# Patient Record
Sex: FEMALE | Race: WHITE | NOT HISPANIC OR LATINO | Employment: FULL TIME | URBAN - METROPOLITAN AREA
[De-identification: names, ages, dates, MRNs, and addresses within clinical notes are randomized per-mention and may not be internally consistent; named-entity substitution may affect disease eponyms.]

---

## 2018-11-26 LAB
ABO + RH BLD: NORMAL
ABO + RH BLD: NORMAL
HBV SURFACE AG SERPL QL IA: NEGATIVE
HIV 1+2 AB+HIV1 P24 AG SERPL QL IA: NEGATIVE
RUBELLA ABY IGG: NORMAL

## 2019-05-29 LAB — GROUP B STREP PCR: NEGATIVE

## 2019-06-22 ENCOUNTER — ANESTHESIA EVENT (OUTPATIENT)
Dept: OBGYN | Facility: CLINIC | Age: 39
End: 2019-06-22
Payer: COMMERCIAL

## 2019-06-22 ENCOUNTER — HOSPITAL ENCOUNTER (INPATIENT)
Facility: CLINIC | Age: 39
LOS: 3 days | Discharge: HOME HEALTH | End: 2019-06-25
Attending: SPECIALIST | Admitting: SPECIALIST
Payer: COMMERCIAL

## 2019-06-22 ENCOUNTER — ANESTHESIA (OUTPATIENT)
Dept: OBGYN | Facility: CLINIC | Age: 39
End: 2019-06-22
Payer: COMMERCIAL

## 2019-06-22 ENCOUNTER — HOSPITAL ENCOUNTER (OUTPATIENT)
Facility: CLINIC | Age: 39
Discharge: HOME | End: 2019-06-22
Attending: SPECIALIST | Admitting: SPECIALIST
Payer: COMMERCIAL

## 2019-06-22 VITALS
BODY MASS INDEX: 32.28 KG/M2 | DIASTOLIC BLOOD PRESSURE: 79 MMHG | WEIGHT: 171 LBS | RESPIRATION RATE: 16 BRPM | TEMPERATURE: 98.1 F | HEART RATE: 94 BPM | SYSTOLIC BLOOD PRESSURE: 122 MMHG | HEIGHT: 61 IN

## 2019-06-22 LAB
ABO + RH BLD: NORMAL
ABO + RH BLD: NORMAL
BLD GP AB SCN SERPL QL: NORMAL
BLOOD BANK CMNT PATIENT-IMP: NORMAL
HGB BLD-MCNC: 12.5 G/DL (ref 11.7–15.7)
SPECIMEN EXP DATE BLD: NORMAL

## 2019-06-22 PROCEDURE — 86780 TREPONEMA PALLIDUM: CPT | Performed by: SPECIALIST

## 2019-06-22 PROCEDURE — 00HU33Z INSERTION OF INFUSION DEVICE INTO SPINAL CANAL, PERCUTANEOUS APPROACH: ICD-10-PCS | Performed by: ANESTHESIOLOGY

## 2019-06-22 PROCEDURE — 59025 FETAL NON-STRESS TEST: CPT

## 2019-06-22 PROCEDURE — G0463 HOSPITAL OUTPT CLINIC VISIT: HCPCS | Mod: 25

## 2019-06-22 PROCEDURE — 25800030 ZZH RX IP 258 OP 636: Performed by: SPECIALIST

## 2019-06-22 PROCEDURE — 86850 RBC ANTIBODY SCREEN: CPT | Performed by: SPECIALIST

## 2019-06-22 PROCEDURE — 27110038 ZZH RX 271: Performed by: ANESTHESIOLOGY

## 2019-06-22 PROCEDURE — 85018 HEMOGLOBIN: CPT | Performed by: SPECIALIST

## 2019-06-22 PROCEDURE — 86901 BLOOD TYPING SEROLOGIC RH(D): CPT | Performed by: SPECIALIST

## 2019-06-22 PROCEDURE — 37000011 ZZH ANESTHESIA WARD SERVICE

## 2019-06-22 PROCEDURE — 10907ZC DRAINAGE OF AMNIOTIC FLUID, THERAPEUTIC FROM PRODUCTS OF CONCEPTION, VIA NATURAL OR ARTIFICIAL OPENING: ICD-10-PCS | Performed by: SPECIALIST

## 2019-06-22 PROCEDURE — 25000128 H RX IP 250 OP 636: Performed by: SPECIALIST

## 2019-06-22 PROCEDURE — 3E0R3BZ INTRODUCTION OF ANESTHETIC AGENT INTO SPINAL CANAL, PERCUTANEOUS APPROACH: ICD-10-PCS | Performed by: ANESTHESIOLOGY

## 2019-06-22 PROCEDURE — 36415 COLL VENOUS BLD VENIPUNCTURE: CPT | Performed by: SPECIALIST

## 2019-06-22 PROCEDURE — 12000000 ZZH R&B MED SURG/OB

## 2019-06-22 PROCEDURE — 25000128 H RX IP 250 OP 636: Performed by: ANESTHESIOLOGY

## 2019-06-22 PROCEDURE — 86900 BLOOD TYPING SEROLOGIC ABO: CPT | Performed by: SPECIALIST

## 2019-06-22 RX ORDER — ACETAMINOPHEN 325 MG/1
650 TABLET ORAL EVERY 4 HOURS PRN
Status: DISCONTINUED | OUTPATIENT
Start: 2019-06-22 | End: 2019-06-23

## 2019-06-22 RX ORDER — FENTANYL CITRATE 50 UG/ML
50-100 INJECTION, SOLUTION INTRAMUSCULAR; INTRAVENOUS
Status: DISCONTINUED | OUTPATIENT
Start: 2019-06-22 | End: 2019-06-23

## 2019-06-22 RX ORDER — IBUPROFEN 400 MG/1
800 TABLET, FILM COATED ORAL
Status: COMPLETED | OUTPATIENT
Start: 2019-06-22 | End: 2019-06-23

## 2019-06-22 RX ORDER — METHYLERGONOVINE MALEATE 0.2 MG/ML
200 INJECTION INTRAVENOUS
Status: DISCONTINUED | OUTPATIENT
Start: 2019-06-22 | End: 2019-06-23

## 2019-06-22 RX ORDER — OXYTOCIN 10 [USP'U]/ML
10 INJECTION, SOLUTION INTRAMUSCULAR; INTRAVENOUS
Status: DISCONTINUED | OUTPATIENT
Start: 2019-06-22 | End: 2019-06-23

## 2019-06-22 RX ORDER — ONDANSETRON 2 MG/ML
4 INJECTION INTRAMUSCULAR; INTRAVENOUS EVERY 6 HOURS PRN
Status: DISCONTINUED | OUTPATIENT
Start: 2019-06-22 | End: 2019-06-23

## 2019-06-22 RX ORDER — NALOXONE HYDROCHLORIDE 0.4 MG/ML
.1-.4 INJECTION, SOLUTION INTRAMUSCULAR; INTRAVENOUS; SUBCUTANEOUS
Status: DISCONTINUED | OUTPATIENT
Start: 2019-06-22 | End: 2019-06-23

## 2019-06-22 RX ORDER — OXYTOCIN/0.9 % SODIUM CHLORIDE 30/500 ML
100-340 PLASTIC BAG, INJECTION (ML) INTRAVENOUS CONTINUOUS PRN
Status: DISCONTINUED | OUTPATIENT
Start: 2019-06-22 | End: 2019-06-23

## 2019-06-22 RX ORDER — LIDOCAINE HYDROCHLORIDE AND EPINEPHRINE 15; 5 MG/ML; UG/ML
INJECTION, SOLUTION EPIDURAL PRN
Status: DISCONTINUED | OUTPATIENT
Start: 2019-06-22 | End: 2019-06-23 | Stop reason: HOSPADM

## 2019-06-22 RX ORDER — PRENATAL VIT/IRON FUM/FOLIC AC 27MG-0.8MG
1 TABLET ORAL DAILY
COMMUNITY

## 2019-06-22 RX ORDER — OXYCODONE AND ACETAMINOPHEN 5; 325 MG/1; MG/1
1 TABLET ORAL
Status: DISCONTINUED | OUTPATIENT
Start: 2019-06-22 | End: 2019-06-23

## 2019-06-22 RX ORDER — SODIUM CHLORIDE, SODIUM LACTATE, POTASSIUM CHLORIDE, CALCIUM CHLORIDE 600; 310; 30; 20 MG/100ML; MG/100ML; MG/100ML; MG/100ML
INJECTION, SOLUTION INTRAVENOUS CONTINUOUS
Status: DISCONTINUED | OUTPATIENT
Start: 2019-06-22 | End: 2019-06-23

## 2019-06-22 RX ORDER — BACLOFEN 20 MG
TABLET ORAL
COMMUNITY

## 2019-06-22 RX ORDER — ROPIVACAINE HYDROCHLORIDE 2 MG/ML
10 INJECTION, SOLUTION EPIDURAL; INFILTRATION; PERINEURAL ONCE
Status: COMPLETED | OUTPATIENT
Start: 2019-06-22 | End: 2019-06-23

## 2019-06-22 RX ORDER — CETIRIZINE HYDROCHLORIDE 10 MG/1
10 TABLET ORAL DAILY
COMMUNITY

## 2019-06-22 RX ORDER — ONDANSETRON 2 MG/ML
4 INJECTION INTRAMUSCULAR; INTRAVENOUS EVERY 6 HOURS PRN
Status: DISCONTINUED | OUTPATIENT
Start: 2019-06-22 | End: 2019-06-22 | Stop reason: HOSPADM

## 2019-06-22 RX ORDER — NALBUPHINE HYDROCHLORIDE 10 MG/ML
2.5-5 INJECTION, SOLUTION INTRAMUSCULAR; INTRAVENOUS; SUBCUTANEOUS EVERY 6 HOURS PRN
Status: DISCONTINUED | OUTPATIENT
Start: 2019-06-22 | End: 2019-06-23

## 2019-06-22 RX ORDER — EPHEDRINE SULFATE 50 MG/ML
5 INJECTION, SOLUTION INTRAMUSCULAR; INTRAVENOUS; SUBCUTANEOUS
Status: DISCONTINUED | OUTPATIENT
Start: 2019-06-22 | End: 2019-06-23

## 2019-06-22 RX ORDER — CARBOPROST TROMETHAMINE 250 UG/ML
250 INJECTION, SOLUTION INTRAMUSCULAR
Status: DISCONTINUED | OUTPATIENT
Start: 2019-06-22 | End: 2019-06-23

## 2019-06-22 RX ADMIN — LIDOCAINE HYDROCHLORIDE AND EPINEPHRINE 3 ML: 15; 5 INJECTION, SOLUTION EPIDURAL at 21:57

## 2019-06-22 RX ADMIN — FENTANYL CITRATE 100 MCG: 50 INJECTION INTRAMUSCULAR; INTRAVENOUS at 20:05

## 2019-06-22 RX ADMIN — Medication 12 ML/HR: at 21:58

## 2019-06-22 RX ADMIN — SODIUM CHLORIDE, POTASSIUM CHLORIDE, SODIUM LACTATE AND CALCIUM CHLORIDE 1000 ML: 600; 310; 30; 20 INJECTION, SOLUTION INTRAVENOUS at 20:58

## 2019-06-22 RX ADMIN — SODIUM CHLORIDE, POTASSIUM CHLORIDE, SODIUM LACTATE AND CALCIUM CHLORIDE: 600; 310; 30; 20 INJECTION, SOLUTION INTRAVENOUS at 22:14

## 2019-06-22 ASSESSMENT — MIFFLIN-ST. JEOR: SCORE: 1398.03

## 2019-06-22 ASSESSMENT — LIFESTYLE VARIABLES: TOBACCO_USE: 0

## 2019-06-22 NOTE — PLAN OF CARE
Patient arrived to triage with her  Kaden stating she had been having painful contractions since around 0230. External US and toco applied with patient consent. Admission assessment completed.    Dr. Laguerre updated on VS, FHT's, contraction q 1-3 that palpate moderate, SVE, patient breathing through contractions. Orders to have patient ambulate for an hour and recheck her cervix at that time.     0559 Dr. Laguerre updated on minimal SVE change, contractions q2-4 minutes, patient has a history of fast labors and is nervous about going home. Patient will ambulate for an additional hour and recheck cervix at that time.     0710 Dr. Laguerre updated on SVE unchanged, contractions q 5 minutes, FHT's moderate variability with accelerations. Received orders to discharge patient to home    Reviewed discharge information with patient, patient verbalized understanding. Encouraged patient to return to hospital if contractions become more frequent/ intense. Patient left ambulatory with her .

## 2019-06-22 NOTE — PLAN OF CARE
Pt, 38 week gestation multip, , returned to MAC this evening at 1740.  Pt's  present.  Pt continued to contract all day, but did say that the intensity decreased.  The intensity of the contractions increased and frequency was consistently every 2-4 minutes, taking her breath away at times, since approximately 1630.  Pt was 2-3cm/60%/-2 this morning here at the [facility].    EUS/TOCO placed with verbal consent or fetal and uterine monitoring.  Contractions noted every 2-4 minutes, palpating moderate.  Pt able to talk through them, but with some difficulty.  SVE done at 1800.  Cervix 3cm/60%/-2, posterior position, presenting part reached and felt like vertex,  Bloody show present.  NST reactive.  Dr. Orozco updated over phone.  Plan is to have pt. Ambulate for at least an hour and recheck.    Pt agrees with plan.

## 2019-06-22 NOTE — DISCHARGE INSTRUCTIONS
Discharge Instruction for Undelivered Patients      You were seen for: Labor Assessment  We Consulted: Dr. Laguerre  You had (Test or Medicine): fetal and uterine monitoring, cervical exam     Diet:   Drink 8 to 12 glasses of liquids (milk, juice, water) every day.  You may eat meals and snacks.     Activity:  Count fetal kicks everyday (see handout)  Call your doctor or nurse midwife if your baby is moving less than usual.     Call your provider if you notice:  Swelling in your face or increased swelling in your hands or legs.  Headaches that are not relieved by Tylenol (acetaminophen).  Changes in your vision (blurring: seeing spots or stars.)  Nausea (sick to your stomach) and vomiting (throwing up).   Weight gain of 5 pounds or more per week.  Heartburn that doesn't go away.  Signs of bladder infection: pain when you urinate (use the toilet), need to go more often and more urgently.  The bag of salcido (rupture of membranes) breaks, or you notice leaking in your underwear.  Bright red blood in your underwear.  Abdominal (lower belly) or stomach pain.  Second (plus) baby: Contractions (tightening) less than 10 minutes apart and getting stronger.  Increase or change in vaginal discharge (note the color and amount)  Other:     Follow-up:  As scheduled in the clinic

## 2019-06-23 LAB — T PALLIDUM AB SER QL: NONREACTIVE

## 2019-06-23 PROCEDURE — 25000128 H RX IP 250 OP 636: Performed by: SPECIALIST

## 2019-06-23 PROCEDURE — 0KQM0ZZ REPAIR PERINEUM MUSCLE, OPEN APPROACH: ICD-10-PCS | Performed by: SPECIALIST

## 2019-06-23 PROCEDURE — 72200001 ZZH LABOR CARE VAGINAL DELIVERY SINGLE

## 2019-06-23 PROCEDURE — 25000132 ZZH RX MED GY IP 250 OP 250 PS 637: Performed by: SPECIALIST

## 2019-06-23 PROCEDURE — 12000035 ZZH R&B POSTPARTUM

## 2019-06-23 PROCEDURE — 25000128 H RX IP 250 OP 636: Performed by: ANESTHESIOLOGY

## 2019-06-23 PROCEDURE — 25800030 ZZH RX IP 258 OP 636: Performed by: OBSTETRICS & GYNECOLOGY

## 2019-06-23 PROCEDURE — 25000125 ZZHC RX 250: Performed by: ANESTHESIOLOGY

## 2019-06-23 PROCEDURE — 25800030 ZZH RX IP 258 OP 636: Performed by: SPECIALIST

## 2019-06-23 PROCEDURE — 25000132 ZZH RX MED GY IP 250 OP 250 PS 637: Performed by: OBSTETRICS & GYNECOLOGY

## 2019-06-23 PROCEDURE — 25000125 ZZHC RX 250: Performed by: SPECIALIST

## 2019-06-23 PROCEDURE — 25000128 H RX IP 250 OP 636: Performed by: OBSTETRICS & GYNECOLOGY

## 2019-06-23 RX ORDER — EPHEDRINE SULFATE 50 MG/ML
5 INJECTION, SOLUTION INTRAMUSCULAR; INTRAVENOUS; SUBCUTANEOUS
Status: DISCONTINUED | OUTPATIENT
Start: 2019-06-23 | End: 2019-06-23

## 2019-06-23 RX ORDER — AMPICILLIN AND SULBACTAM 2; 1 G/1; G/1
3 INJECTION, POWDER, FOR SOLUTION INTRAMUSCULAR; INTRAVENOUS EVERY 6 HOURS
Status: DISCONTINUED | OUTPATIENT
Start: 2019-06-23 | End: 2019-06-23

## 2019-06-23 RX ORDER — NALBUPHINE HYDROCHLORIDE 10 MG/ML
2.5-5 INJECTION, SOLUTION INTRAMUSCULAR; INTRAVENOUS; SUBCUTANEOUS EVERY 6 HOURS PRN
Status: DISCONTINUED | OUTPATIENT
Start: 2019-06-23 | End: 2019-06-23

## 2019-06-23 RX ORDER — ROPIVACAINE HYDROCHLORIDE 2 MG/ML
10 INJECTION, SOLUTION EPIDURAL; INFILTRATION; PERINEURAL ONCE
Status: COMPLETED | OUTPATIENT
Start: 2019-06-23 | End: 2019-06-23

## 2019-06-23 RX ORDER — ROPIVACAINE HYDROCHLORIDE 2 MG/ML
INJECTION, SOLUTION EPIDURAL; INFILTRATION; PERINEURAL
Status: COMPLETED
Start: 2019-06-23 | End: 2019-06-23

## 2019-06-23 RX ORDER — ROPIVACAINE HYDROCHLORIDE 2 MG/ML
10 INJECTION, SOLUTION EPIDURAL; INFILTRATION; PERINEURAL ONCE
Status: DISCONTINUED | OUTPATIENT
Start: 2019-06-23 | End: 2019-06-23

## 2019-06-23 RX ORDER — NALOXONE HYDROCHLORIDE 0.4 MG/ML
.1-.4 INJECTION, SOLUTION INTRAMUSCULAR; INTRAVENOUS; SUBCUTANEOUS
Status: DISCONTINUED | OUTPATIENT
Start: 2019-06-23 | End: 2019-06-25 | Stop reason: HOSPADM

## 2019-06-23 RX ORDER — MISOPROSTOL 200 UG/1
800 TABLET ORAL ONCE
Status: COMPLETED | OUTPATIENT
Start: 2019-06-23 | End: 2019-06-23

## 2019-06-23 RX ORDER — ACETAMINOPHEN 325 MG/1
650 TABLET ORAL EVERY 4 HOURS PRN
Status: DISCONTINUED | OUTPATIENT
Start: 2019-06-23 | End: 2019-06-25 | Stop reason: HOSPADM

## 2019-06-23 RX ORDER — AMOXICILLIN 250 MG
2 CAPSULE ORAL 2 TIMES DAILY
Status: DISCONTINUED | OUTPATIENT
Start: 2019-06-23 | End: 2019-06-25 | Stop reason: HOSPADM

## 2019-06-23 RX ORDER — ACETAMINOPHEN 325 MG/1
TABLET ORAL
Status: DISCONTINUED
Start: 2019-06-23 | End: 2019-06-23 | Stop reason: HOSPADM

## 2019-06-23 RX ORDER — LANOLIN 100 %
OINTMENT (GRAM) TOPICAL
Status: DISCONTINUED | OUTPATIENT
Start: 2019-06-23 | End: 2019-06-25 | Stop reason: HOSPADM

## 2019-06-23 RX ORDER — OXYTOCIN/0.9 % SODIUM CHLORIDE 30/500 ML
100 PLASTIC BAG, INJECTION (ML) INTRAVENOUS CONTINUOUS
Status: DISCONTINUED | OUTPATIENT
Start: 2019-06-23 | End: 2019-06-25 | Stop reason: HOSPADM

## 2019-06-23 RX ORDER — TERBUTALINE SULFATE 1 MG/ML
0.25 INJECTION, SOLUTION SUBCUTANEOUS ONCE
Status: COMPLETED | OUTPATIENT
Start: 2019-06-23 | End: 2019-06-23

## 2019-06-23 RX ORDER — NALOXONE HYDROCHLORIDE 0.4 MG/ML
.1-.4 INJECTION, SOLUTION INTRAMUSCULAR; INTRAVENOUS; SUBCUTANEOUS
Status: DISCONTINUED | OUTPATIENT
Start: 2019-06-23 | End: 2019-06-23

## 2019-06-23 RX ORDER — BISACODYL 10 MG
10 SUPPOSITORY, RECTAL RECTAL DAILY PRN
Status: DISCONTINUED | OUTPATIENT
Start: 2019-06-25 | End: 2019-06-25 | Stop reason: HOSPADM

## 2019-06-23 RX ORDER — FENTANYL CITRATE 50 UG/ML
100 INJECTION, SOLUTION INTRAMUSCULAR; INTRAVENOUS ONCE
Status: COMPLETED | OUTPATIENT
Start: 2019-06-23 | End: 2019-06-23

## 2019-06-23 RX ORDER — AMOXICILLIN 250 MG
1 CAPSULE ORAL 2 TIMES DAILY
Status: DISCONTINUED | OUTPATIENT
Start: 2019-06-23 | End: 2019-06-25 | Stop reason: HOSPADM

## 2019-06-23 RX ORDER — ACETAMINOPHEN 325 MG/1
975 TABLET ORAL EVERY 6 HOURS
Status: DISCONTINUED | OUTPATIENT
Start: 2019-06-23 | End: 2019-06-23

## 2019-06-23 RX ORDER — LIDOCAINE 40 MG/G
CREAM TOPICAL
Status: DISCONTINUED | OUTPATIENT
Start: 2019-06-23 | End: 2019-06-23

## 2019-06-23 RX ORDER — OXYTOCIN/0.9 % SODIUM CHLORIDE 30/500 ML
340 PLASTIC BAG, INJECTION (ML) INTRAVENOUS CONTINUOUS PRN
Status: DISCONTINUED | OUTPATIENT
Start: 2019-06-23 | End: 2019-06-25 | Stop reason: HOSPADM

## 2019-06-23 RX ORDER — IBUPROFEN 600 MG/1
600 TABLET, FILM COATED ORAL EVERY 6 HOURS
Status: DISCONTINUED | OUTPATIENT
Start: 2019-06-23 | End: 2019-06-25 | Stop reason: HOSPADM

## 2019-06-23 RX ORDER — HYDROCORTISONE 2.5 %
CREAM (GRAM) TOPICAL 3 TIMES DAILY PRN
Status: DISCONTINUED | OUTPATIENT
Start: 2019-06-23 | End: 2019-06-25 | Stop reason: HOSPADM

## 2019-06-23 RX ORDER — TERBUTALINE SULFATE 1 MG/ML
INJECTION, SOLUTION SUBCUTANEOUS
Status: DISCONTINUED
Start: 2019-06-23 | End: 2019-06-23 | Stop reason: HOSPADM

## 2019-06-23 RX ORDER — OXYTOCIN/0.9 % SODIUM CHLORIDE 30/500 ML
1-24 PLASTIC BAG, INJECTION (ML) INTRAVENOUS CONTINUOUS
Status: DISCONTINUED | OUTPATIENT
Start: 2019-06-23 | End: 2019-06-23

## 2019-06-23 RX ORDER — CALCIUM CARBONATE 500 MG/1
1000 TABLET, CHEWABLE ORAL EVERY 4 HOURS PRN
Status: DISCONTINUED | OUTPATIENT
Start: 2019-06-23 | End: 2019-06-25 | Stop reason: HOSPADM

## 2019-06-23 RX ORDER — OXYTOCIN 10 [USP'U]/ML
10 INJECTION, SOLUTION INTRAMUSCULAR; INTRAVENOUS
Status: DISCONTINUED | OUTPATIENT
Start: 2019-06-23 | End: 2019-06-25 | Stop reason: HOSPADM

## 2019-06-23 RX ADMIN — ACETAMINOPHEN 650 MG: 325 TABLET, FILM COATED ORAL at 22:10

## 2019-06-23 RX ADMIN — Medication 5 MG: at 02:34

## 2019-06-23 RX ADMIN — IBUPROFEN 600 MG: 600 TABLET ORAL at 23:06

## 2019-06-23 RX ADMIN — TERBUTALINE SULFATE 0.25 MG: 1 INJECTION SUBCUTANEOUS at 01:00

## 2019-06-23 RX ADMIN — FENTANYL CITRATE 100 MCG: 50 INJECTION INTRAMUSCULAR; INTRAVENOUS at 01:56

## 2019-06-23 RX ADMIN — CALCIUM CARBONATE (ANTACID) CHEW TAB 500 MG 1000 MG: 500 CHEW TAB at 23:09

## 2019-06-23 RX ADMIN — ACETAMINOPHEN 975 MG: 325 TABLET, FILM COATED ORAL at 03:56

## 2019-06-23 RX ADMIN — ROPIVACAINE HYDROCHLORIDE 10 ML: 2 INJECTION, SOLUTION EPIDURAL; INFILTRATION at 08:52

## 2019-06-23 RX ADMIN — SODIUM CHLORIDE, POTASSIUM CHLORIDE, SODIUM LACTATE AND CALCIUM CHLORIDE 125 ML/HR: 600; 310; 30; 20 INJECTION, SOLUTION INTRAVENOUS at 00:07

## 2019-06-23 RX ADMIN — ROPIVACAINE HYDROCHLORIDE 5 ML: 2 INJECTION, SOLUTION EPIDURAL; INFILTRATION at 02:11

## 2019-06-23 RX ADMIN — AMPICILLIN SODIUM AND SULBACTAM SODIUM 3 G: 2; 1 INJECTION, POWDER, FOR SOLUTION INTRAMUSCULAR; INTRAVENOUS at 04:01

## 2019-06-23 RX ADMIN — OXYTOCIN 2 MILLI-UNITS/MIN: 10 INJECTION INTRAVENOUS at 05:15

## 2019-06-23 RX ADMIN — ACETAMINOPHEN 650 MG: 325 TABLET, FILM COATED ORAL at 17:15

## 2019-06-23 RX ADMIN — LIDOCAINE HYDROCHLORIDE 10 ML: 10 INJECTION, SOLUTION INFILTRATION; PERINEURAL at 10:00

## 2019-06-23 RX ADMIN — MISOPROSTOL 800 MCG: 200 TABLET ORAL at 10:18

## 2019-06-23 RX ADMIN — Medication 5 MG: at 03:30

## 2019-06-23 RX ADMIN — SENNOSIDES AND DOCUSATE SODIUM 1 TABLET: 8.6; 5 TABLET ORAL at 20:25

## 2019-06-23 RX ADMIN — FENTANYL CITRATE 100 MCG: 50 INJECTION, SOLUTION INTRAMUSCULAR; INTRAVENOUS at 08:51

## 2019-06-23 RX ADMIN — LIDOCAINE HYDROCHLORIDE AND EPINEPHRINE 3 ML: 15; 5 INJECTION, SOLUTION EPIDURAL at 02:10

## 2019-06-23 RX ADMIN — FENTANYL CITRATE 50 MCG: 50 INJECTION INTRAMUSCULAR; INTRAVENOUS at 01:04

## 2019-06-23 RX ADMIN — ACETAMINOPHEN 650 MG: 325 TABLET, FILM COATED ORAL at 10:47

## 2019-06-23 RX ADMIN — ROPIVACAINE HYDROCHLORIDE 10 ML: 2 INJECTION, SOLUTION EPIDURAL; INFILTRATION at 01:52

## 2019-06-23 RX ADMIN — SODIUM CHLORIDE, POTASSIUM CHLORIDE, SODIUM LACTATE AND CALCIUM CHLORIDE 125 ML/HR: 600; 310; 30; 20 INJECTION, SOLUTION INTRAVENOUS at 06:56

## 2019-06-23 RX ADMIN — IBUPROFEN 800 MG: 400 TABLET ORAL at 10:47

## 2019-06-23 RX ADMIN — IBUPROFEN 600 MG: 600 TABLET ORAL at 17:16

## 2019-06-23 RX ADMIN — Medication 5 MG: at 04:56

## 2019-06-23 RX ADMIN — SODIUM CHLORIDE, POTASSIUM CHLORIDE, SODIUM LACTATE AND CALCIUM CHLORIDE 125 ML/HR: 600; 310; 30; 20 INJECTION, SOLUTION INTRAVENOUS at 02:05

## 2019-06-23 NOTE — PROGRESS NOTES
L&D Progress Note    Pt CTX ing q1.5-2min  Mixture of early and late decels, mod aleksandr  Scalp stim appreciated and easily obtained  Give terb 0.25mcg given tachysystole  SVE 7/90/-1, more bloody show present   Attempt at pulling epidural back and bolusing has not helped  Can try a small dose of Fentanyl but will alert anesthesia, may need to consider epidural replacement    Electronically signed by:  Sharyn Orozco  [facility]  [facility]  Pager: [phone]  June 23, 2019

## 2019-06-23 NOTE — PLAN OF CARE
Possible prolonged decel to 80's. Spotty capture and difficult to trace. Pt repositioned left and right and monitors adjusted, very active fetal movement. Pt breathing through contractions. Dr Sullivan in department and called to bedside, SVE 4/60/-2, membranes intact. Return to baseline with minimal variability. Will closely monitor.

## 2019-06-23 NOTE — PLAN OF CARE
Assumed care of patient at 0715.  Pt in very much pain on her left side during contractions, she describes it as sciatica or nerves along with rectal pressure,  She was numb from her epidural on her right side.  Pt coping with support from her , Kaden, and needs coaching for breathing to get through.  She stated that she did think she could keep going that way.  FHTs 140s baseline, with moderate variability, occasional accelerations, and occasional early and late decelerations.  Maternal vital signs stable besides pain.    Dr. Blackmon called and asked to assess patient and see if there were any options for pain relief. Dr. Blackmon at bedside at 0745.    After discussing and waiting, Dr. Blackmon at bedside again at 0830.  Decision made with MDA and patient to replace epidural again.    Epidural replaced in routine fashion at 0840.  After receiving bolus, pt very soon felt relief on her left side.     MVUs were at about 180 on 4 mu.  Pitocin increased  Max amount 8 mu.    Pt started to feel increased pressure.  Dr. Sullivan  At bedside. SVE found cervix to be completely dilated and +2 station.      Room and Pt. Prepped for delivery.  After pushing through 2 contractions, viable baby girl delivery at 0946.  See MDA note, OB note, and delivery summary for further details.

## 2019-06-23 NOTE — PROGRESS NOTES
L&D Progress Note    Patient is comfortable s/p repeat catheter placement  Cervix is 6-7/80/-1 and feels more edematous, feels LOP/asynclitic  Patient feels warm  Has had a prior T of 100.6 and another now of 101.3  Fetal tachycardia is appreciated  Will treat for chorioamnionitis, give Tylenol and Unasyn  IUPC placed will likely need to start Pitocin augmentation  Reviewed this with patient    Electronically signed by:  Sharyn Orozco  [facility] Office  Pager: [phone]  June 23, 2019

## 2019-06-23 NOTE — ANESTHESIA PROCEDURE NOTES
Peripheral nerve/Neuraxial procedure note : epidural catheter  Pre-Procedure  Performed by Luis Manuel Patterson MD  Location: OB      Pre-Anesthestic Checklist: patient identified, IV checked, site marked, risks and benefits discussed, informed consent, monitors and equipment checked, pre-op evaluation and at physician/surgeon's request    Timeout  Correct Patient: Yes   Correct Procedure: Yes   Correct Site: Yes   Correct Laterality: Yes   Correct Position: Yes   Site Marked: Yes   .   Procedure Documentation    .    Procedure:    Epidural catheter.  Insertion Site:L1-2  (midline approach) Injection technique: LORT saline and LORT air   Local skin infiltrated with 1 mL of 1% lidocaine.       Patient Prep;povidone-iodine 7.5% surgical scrub.  .  Needle: Touhy needle Needle Gauge: 17.    Needle Length (Inches) 3.5  # of attempts: 1 and # of redirects:  .   Catheter: 19 G . .  Catheter threaded easily  .  .   .    Assessment/Narrative  Paresthesias: No.  .  .  Aspiration negative for heme or CSF  . Test dose of 3 mL lidocaine 1.5% w/ 1:200,000 epinephrine at. Test dose negative for signs of intravascular, subdural or intrathecal injection. Comments:  Pre-procedure time out completed. Patient in sitting position, the lumbar spine was prepped and draped in sterile fashion. The L1/L2 interspace was identified and local anesthetic was injected for local skin infiltration. A 17 G touhy needle was advanced to the epidural space which was confirmed with the loss of resistance technique at 5 cm. A catheter was then advanced easily into the epidural space. The catheter was left at 9 cm at the skin. Negative aspiration of blood and CSF was confirmed. A test dose of 1.5% lidocaine with 1:200,000 epinephrine was injected through the catheter and was negative for intravascular injection. The site was covered with sterile tegaderm and the catheter was secured with tape.

## 2019-06-23 NOTE — L&D DELIVERY NOTE
"OB Vaginal Delivery Note    Chelle Pastrana MRN# [id number]   Age: 39 year old YOB: 1980       GA: 38w5d  GP:   Labor Complications: None   EBL: 300  mL  QBL:    Delivery Type: Vaginal, Spontaneous   ROM to Delivery Time: 12h 26m  [place] Weight: 3.5 kg (7 lb 11.5 oz)    1 Minute 5 Minute 10 Minute   Apgar Totals: 8    9          EJ REGALADO     Delivery Details:  This 39 year old   @38w5d presented last night in early labor, changed cervix from 2 to 4cm throughout the day. Admitted and given IV pain medicine then eventually epidural. AROM at 920pm, then labor was augmenteted with pitocin after epidural. She ended up getting 3 epidurals as 1st 2 did not provide adequate pain relief. At 330am had a temp of 101.3 and fetal tachycardia so diagnosed with chorio and started on unasyn. She then remained afebrile after that. Eventually progressed to complete at 0930 this am.  FHT during labor/pushing category I.  Patient pushed over 2 contraction to deliver a viable female infant in OA position at 0946 on 2019 . Head came out OA but then had to rotate head to get shoulder out as baby's body was twisted to OP orientation. Loose nuchal cord was reduced then shoulders delivered without difficulty. Infant placed on mother's abdomen and attended to by waiting nursing staff.Cord clamped and cut after 1 minute. Pitocin started  Placenta spontaneously delivered at 0949 intact with 3VC. Inspection revealed a 2nd degree laceration, repaired with 3-0 chromic in usual fashion. Rectal exam after repair revealed no intervening suture and normal tone. 800mcg cytotec placed to help ADALI firm up No complications. Mother and baby \"Caleb\" stable. Baby brought to NICU for abx for chorio.      Labor Event Times    Labor onset date:  19 Onset time:  10:05 PM   Dilation complete date:  19 Complete time:   9:30 AM   Start pushing date/time:  2019 0942      Labor Length    1st Stage (hrs):  11 (min):  " 25   2nd Stage (hrs):  0 (min):  16   3rd Stage (hrs):  0 (min):  3      Labor Events     labor?:  No   steroids:  None  Labor Type:  Spontaneous, Augmentation  Predominate monitoring during 1st stage:  continuous electronic fetal monitoring     Antibiotics received during labor?:  Yes  Reason for Antibiotics:  Chorioamnionitis  Antibiotics given for Chorioamnionitis:  Ampicillin-Sulbactum  Antibiotics Given (Chorioamnionitis):  Greater than 4 hours prior to delivery     Rupture date/time: 19   Rupture type:  Artificial Rupture of Membranes  Fluid color:  Clear, Bloody  Fluid odor:  Normal     Augmentation:  AROM, Oxytocin  Indications for augmentation:  Ineffective Contraction Pattern  1:1 continuous labor support provided by?:  RN Labor partogram used?:  no      Delivery/Placenta Date and Time    Delivery Date:  19 Delivery Time:   9:46 AM   Placenta Date/Time:  2019  9:49 AM     Vaginal Counts     Initial count performed by 2 team members:   Two Team Members   Dr. Nate Coughlin, RN       Needles Suture [place] Sponges Instruments   Initial counts 2 0 5    Added to count 0 1 0    Final counts 2 1 5    Placed during labor Accounted for at the end of labor   No NA   Yes Yes   No NA           Apgars    Living status:  Living   1 Minute 5 Minute 10 Minute 15 Minute 20 Minute   Skin color: 0  1       Heart rate: 2  2       Reflex irritability: 2  2       Muscle tone: 2  2       Respiratory effort: 2  2       Total: 8  9          [place] Resuscitation    Methods:  None  [place] Care at Delivery:  Called to delivery for chorioamnionitis diagnosis  By Dr. Sullivan.  Infant cried at delivery and placed on mom's abdomen. Infant apprteciated 60 seconds of delayed cord clamping.  Infant did skin to skin bonding with mom before transfer to the NICU in a crib.     Dayana Arredondo, APRN- CNP, NNP 19     [place] Measurements    Weight:  7 lb 11.5 oz    Head circumference:  35.6 cm        Labor Events and Shoulder Dystocia    Fetal Tracing Prior to Delivery:  Category 1  Shoulder dystocia present?:  Neg     Delivery (Maternal) (Provider to Complete) ([id number])    Episiotomy:  None  Perineal lacerations:  2nd Repaired?:  Yes   Est. blood loss (mL):  300  Number of repair packets:  1     Blood Loss  Mother: Chelle Pastrana #[id number]   Start of Mother's Information    IO Blood Loss  06/22/19 2205 - 06/23/19 1023    EBL (mL) Hospital Encounter 300 mL    Total  300 mL         End of Mother's Information  Mother: Chelle Pastrana #[id number]         Delivery - Provider to Complete ([id number])    Delivering clinician:  Casi Sullivan,   Attempted Delivery Types (Choose all that apply):  Spontaneous Vaginal Delivery  Delivery Type (Choose the 1 that will go to the Birth History):  Vaginal, Spontaneous   Other personnel:   Provider Role   Yamilet Beth RN Registered Nurse         Placenta    Delayed Cord Clamping:  Done  Date/Time:  6/23/2019  9:49 AM  Removal:  Spontaneous  Disposition:  Hospital disposal     Anesthesia    Method:  Epidural  Cervical dilation at placement:  4-7   Analgesic:   BIRTH HISTORY: ANALGESIC   FENTANYL 2 MCG/ML ROPIVACAINE 0.2% IN  ML BAG CNR         Presentation and Position    Presentation:  Vertex  Position:  Left Occiput Anterior           Primary OB doctor: Ny Castro MD     Electronically signed by  Casi Sullivan  10:35 AM  6/23/2019  [facility]

## 2019-06-23 NOTE — PLAN OF CARE
Rates pain level at 10/10. MDA notified to address the situation. Epidural bolus given 2x, fentanyl 2x with no relief. Epidural reinserted. Patient felt a lot of relief. Blood pressures low due to several administrations of fentanyl. Ephedrine given to maintain BP WDL. FHT with accelerations but with variable and early decelerations. Moderate variability. Pitocin started to augment labor. Moderate to strong contractions every 3-4 mins.

## 2019-06-23 NOTE — PLAN OF CARE
VSS Pt using Ibuprofen and tylenol for pain control. Up to the bathroom and voiding in good amounts. IV saline locked. Safety and security talked about. Pt requesting to sleep. Encouraged patient to call with questions and concerns. Pt desires to go down to NICU to breastfeed baby around 3:30. Will continue to monitor.

## 2019-06-23 NOTE — PLAN OF CARE
Report received from Page KIRBY RN. Orders placed by Dr Sullivan. Fetal monitors applied in room 220. IV placed. Pt requesting fentanyl for pain management, planning on epidural but not ready for it yet. Fentanyl given at 2005, pt rating pain 6/10. Will continue to monitor.

## 2019-06-23 NOTE — PLAN OF CARE
"Epidural placed by Dr Patterson, in room from [id number]. Pt had explained history of low blood pressures with her last two epidurals. No bolus was given, epidural pump was started per orders. Pt assisted to left tilt. Within about 5 minutes pt explaining a burning sensation in her left hip joint area, feels like her hip is \"on fire\". Sensation intensifies during a contraction and gets slightly better between contractions, breathing through and unable to tolerate pain despite position changes. SVE 5/80/-2. MDA paged to assess, Dr Saeed at bedside at 2225, Ropivicane bolus given through epidural catheter and BP's cycled per orders. Pt starting to feel some relief within 15 minutes. Report given to Ana Paula LEMOS, RN to resume care at 2315.  "

## 2019-06-23 NOTE — H&P
OB ADMIT  Chelle Pastrana    CC: contractions  HPI: Chelle Pastrana is a 39 year old  @ 38w4d presented with contractions. She had presented early this am and was 2-3 cm and unchanged so went home. Contractions got better, then at about 430pm became stronger and closer again. Was still 3cm initially, walked for and hour and changed to 4cm. She is c/o of more pain and requesting IV pain meds. Would like epidural eventually. Has had a lot of  mucus discharge and now having slight bleeding as well. No obvious gush of fluid yet but a lot of discharge so is unsure. She is GBS negative.     ROS: ?lof, +vb, -ha  Prenatal care with Dr Castro since 8 weeks  tdap and flu shot given this pregnancy  OB Risks:   AMA - nl NIPT and AFP.     Prenatal labs: A+, Ab negative, rubella immune, VDRL NR, HepBsAg neg, HIV neg, GBS neg, genetic screening wnl, GTT 65/140/137 (passed)    PMHx:   Congential absence of one ovary  endometriosis    PSxHx:  hernia repair   dx lap scope endometriosis  Lap benjy 2016  tonsil 1987  Past OB:  2012 37 week 6#3  (previa resolved 34 weeks) - [place]  10/2/15 37 weeks 6#11  no comps [place]  Meds: PNV, zyrtec, magnesium,  rare flonase use  All: NKDA     Soc:works as / logistics,  to Kaden, Negative tobacco, etoh, drugs      PE: /65   Pulse 72   Temp 98.8  F (37.1  C) (Temporal)   Resp 16   Gen: A&O x 3, NAD   Abd: Gravid, NT, EFW 7+#  Extrems: nt no edema  VE: 4/60/-2 per RN     FHT: 130 with moderate variability, +accels, no decels  Kohler: q 2-4      IMPRESSION:  39 year old  @38w4d  with early labor, requesting pain meds  GBS: neg    PLAN:  admit to L&D  fentanyl now, will get epidural eventually  Will arom if needs augmentation, recheck in 1-2 hours  anticipate     Electronically signed by  Casi Sullivan  7:22 PM  2019  [facility]

## 2019-06-23 NOTE — ANESTHESIA PREPROCEDURE EVALUATION
"Anesthesia Pre-Procedure Evaluation    Patient: Chelle Pastrana   MRN: [id number] : 1980          Preoperative Diagnosis: * No surgery found *        No past medical history on file.  Past Surgical History:   Procedure Laterality Date     C RAD RESEC TONSIL/PILLARS       gaulbladder       HERNIA REPAIR         Anesthesia Evaluation       history and physical reviewed . Pt has had prior anesthetic.     No history of anesthetic complications          ROS/MED HX    ENT/Pulmonary:  - neg pulmonary ROS    (-) tobacco use   Neurologic:  - neg neurologic ROS     Cardiovascular:  - neg cardiovascular ROS       METS/Exercise Tolerance:     Hematologic:         Musculoskeletal:         GI/Hepatic:  - neg GI/hepatic ROS       Renal/Genitourinary:         Endo:         Psychiatric:         Infectious Disease:         Malignancy:         Other:                     neg OB ROS            Physical Exam  Normal systems: cardiovascular, pulmonary and dental    Airway   Mallampati: II  TM distance: > 3 FB  Neck ROM: full  Mouth opening: > 3 cm    Dental     Cardiovascular       Pulmonary             Lab Results   Component Value Date    HGB 12.5 2019       Preop Vitals  BP Readings from Last 3 Encounters:   19 115/72   19 122/79    Pulse Readings from Last 3 Encounters:   19 72   19 94      Resp Readings from Last 3 Encounters:   19 16   19 16    SpO2 Readings from Last 3 Encounters:   No data found for SpO2      Temp Readings from Last 1 Encounters:   19 37.8  C (100  F) (Temporal)    Ht Readings from Last 1 Encounters:   19 1.549 m (5' 1\")      Wt Readings from Last 1 Encounters:   19 77.6 kg (171 lb)    Estimated body mass index is 32.31 kg/m  as calculated from the following:    Height as of an earlier encounter on 19: 1.549 m (5' 1\").    Weight as of an earlier encounter on 19: 77.6 kg (171 lb).       Anesthesia Plan      History & Physical " Review      ASA Status:  2 .  OB Epidural Asa: 2            Postoperative Care      Consents  Anesthetic plan, risks, benefits and alternatives discussed with:  Patient..                 Luis Manuel Patterson MD

## 2019-06-23 NOTE — PLAN OF CARE
Pt upto bathroom, voided, Fudus firm at umbilicus.  To NICU via wheelchair at 1210 to see baby and breastfeed.

## 2019-06-23 NOTE — PROGRESS NOTES
L&D Progress Note    Introduced self to patient  Has epidural but hot spot on left, trying a bolus  Recent exam per RN   Tracing reassuring  Expectant management, anticipate     Electronically signed by:  Sharyn Orozco  [facility] Office  Pager: [phone]  2019

## 2019-06-23 NOTE — PROGRESS NOTES
OB progress    S: got 100mcg fentanyl at 8pm which helped, now feeling more pain again. Ready for more pain meds or possibly epidural.   O: /72   Pulse 72   Temp 100  F (37.8  C) (Temporal)   Resp 16   Aao x3, nad, breathing through contractions   At  had decel with contraction followed by late decel, hard to trace for ~5 minutes but FHT appeared to be in 70s, up to 115 then back to 70s. I checked her at  and was 4cm, BBOW.   Pt was repositioned then monitored more consistently and FHT returned to 140s, moderate variability.  Now with moderate variability, no decels.   AROM at 920 pm, head was much better applied, 4/70/-2.  Now FHT 130s, reactive, moderate variability and no decels. Next 2 contractions after AROM much more painful.   TOCO q 3 min    A: 39 year old  @ 38w4d, early labor, then with prolonged decel, recovered now. AROM done for augmentation.   Now requesting epidural.   P:  epidural now  Pitocin if needed after epidural  Dr Orozco assuming care and aware of above.     Electronically signed by  Casi Sullivan  9:30 PM  2019  [facility]

## 2019-06-23 NOTE — PROGRESS NOTES
OB Progress    S: just got 3rd epidural and is now getting comfortable.  RN check ~1 hour ago was 8cm    O: /65   Pulse 72   Temp 98.5  F (36.9  C) (Temporal)   Resp 18   SpO2 98%    AAOx3, comfortable  cervix: 9+ - just slight rim on right side, +1  , moderate variability, category I  IUPC: ctx q 3-4 min, adequate    A: 39 year old @ 38w5d, now in active labor, slow but steady progress all night but now almost complete  Finally comfortable    P: anticipate  shortly    Electronically signed by  Casi Sullivan  8:45  AM  2019  [facility]

## 2019-06-23 NOTE — PROGRESS NOTES
Anesthesiology note    Called to assess patient for increased pain with contractions, epidural recently bolused ot no effect    Discussed options, patient would like to replace epidural    Epidural pulled with tip intact    Patient sitting  Area prepped in draped sterile  Lido 1% 3cc local  18 g touey  Advanced + fantasma, no csf, no blood, no parasthesias  Catheter threaded 4 cm into epidural space  Secured  Lido 1.5% with epi 1:200k 2 ml test does, no increase heart rate or intrathecal block  Epidural bolused with ropivicaine 0.2% 10 ml and fentanyl 100mcg    Patient comfortable; infusion started    [address]  [phone]

## 2019-06-23 NOTE — ANESTHESIA POSTPROCEDURE EVALUATION
Patient: Chelle Pastrana    * No procedures listed *    Diagnosis:* No pre-op diagnosis entered *  Diagnosis Additional Information: No value filed.    Anesthesia Type:  No value filed.    Note:  Anesthesia Post Evaluation    Patient location during evaluation: floor (Postpartum Unit)  Patient participation: Able to fully participate in evaluation  Level of consciousness: awake and alert  Pain management: adequate  Airway patency: patent  Cardiovascular status: acceptable  Respiratory status: acceptable  Hydration status: acceptable  PONV: none     Anesthetic complications: None    Comments: Pt in NICU and not present for follow-up visit.  No issues per chart.          Last vitals:  Vitals:    06/23/19 1150 06/23/19 1320 06/23/19 1535   BP: 117/58 110/51 122/57   Pulse:   66   Resp: 16 16 16   Temp:  37.5  C (99.5  F) 36.8  C (98.3  F)   SpO2: 96%           Electronically Signed By: Clay Alfred MD  June 23, 2019  4:51 PM

## 2019-06-23 NOTE — PROGRESS NOTES
L&D Progres Note    To room for late decelerations  Tripling contraction appreciated  No change in pressure since very recent cervical exam  Significant pain on left has returned, will request anesthesia see again  Tracing improved with turning, continue to monitor closely    Electronically signed by:  Sharyn Orozco  [facility]  [facility] Office  Pager: [phone]  June 23, 2019

## 2019-06-23 NOTE — PLAN OF CARE
Recheck of cervix finds it to be 3-4/60%/-2.  Pt reports increase in discomfort and feels that she may needs something to help her with pain. Bloody show and mucous noted on exam glove with SVE.    Contractions continue to be regular and consistently intense.      Dr. Sullivan admitting pt.  Pt will go to room 220 with care from Ella Weeks RN.    Dr. Sullivan at bedside right now, discussing plan of care with pt.

## 2019-06-23 NOTE — PROGRESS NOTES
L&D Progress Note    Had good relief x 4 hours w/ new epidural  Now having a lot of discomfort again  Feeling increased pressure  SVE 8-9/80/0 IUPC is in place  Pit is @ 4mU/min -180  Afebrile now, s/p tylenol and first dose of Unasyn  Plan to try to rebolus  Did discuss potential reasons for  section including: fetal intolerance to labor, arrest of dilation and arrest of descent    Electronically signed by:  Sharyn Orozco  [facility]  Pager: [phone]  2019

## 2019-06-23 NOTE — ANESTHESIA PROCEDURE NOTES
Peripheral nerve/Neuraxial procedure note : epidural catheter  Pre-Procedure  Performed by Luis Manuel Patterson MD  Location: OB      Pre-Anesthestic Checklist: patient identified, IV checked, site marked, risks and benefits discussed, informed consent, monitors and equipment checked, pre-op evaluation and at physician/surgeon's request    Timeout  Correct Patient: Yes   Correct Procedure: Yes   Correct Site: Yes   Correct Laterality: Yes   Correct Position: Yes   Site Marked: Yes   .   Procedure Documentation    .    Procedure:    Epidural catheter.  Insertion Site:L2-3  (midline approach) Injection technique: LORT saline and LORT air   Local skin infiltrated with 1 mL of 1% lidocaine.       Patient Prep;povidone-iodine 7.5% surgical scrub.  .  Needle: Touhy needle Needle Gauge: 17.    Needle Length (Inches) 3.5  # of attempts: 1 and # of redirects:  .   Catheter: 19 G . .  Catheter threaded easily  .  .   .    Assessment/Narrative  Paresthesias: No.  .  .  Aspiration negative for heme or CSF  . Test dose of 3 mL lidocaine 1.5% w/ 1:200,000 epinephrine at. Test dose negative for signs of intravascular, subdural or intrathecal injection. Comments:  Pre-procedure time out completed. Patient in sitting position, the lumbar spine was prepped and draped in sterile fashion. The L2/L3 interspace was identified and local anesthetic was injected for local skin infiltration. A 17 G touhy needle was advanced to the epidural space which was confirmed with the loss of resistance technique at 7 cm. A catheter was then advanced easily into the epidural space. The catheter was left at 11 cm at the skin. Negative aspiration of blood and CSF was confirmed. A test dose of 1.5% lidocaine with 1:200,000 epinephrine was injected through the catheter and was negative for intravascular injection. The site was covered with sterile tegaderm and the catheter was secured with tape.

## 2019-06-24 PROCEDURE — 12000035 ZZH R&B POSTPARTUM

## 2019-06-24 PROCEDURE — 25000132 ZZH RX MED GY IP 250 OP 250 PS 637: Performed by: SPECIALIST

## 2019-06-24 RX ADMIN — IBUPROFEN 600 MG: 600 TABLET ORAL at 11:11

## 2019-06-24 RX ADMIN — SENNOSIDES AND DOCUSATE SODIUM 1 TABLET: 8.6; 5 TABLET ORAL at 20:32

## 2019-06-24 RX ADMIN — IBUPROFEN 600 MG: 600 TABLET ORAL at 17:03

## 2019-06-24 RX ADMIN — ACETAMINOPHEN 650 MG: 325 TABLET, FILM COATED ORAL at 11:11

## 2019-06-24 RX ADMIN — IBUPROFEN 600 MG: 600 TABLET ORAL at 22:43

## 2019-06-24 RX ADMIN — ACETAMINOPHEN 650 MG: 325 TABLET, FILM COATED ORAL at 02:04

## 2019-06-24 RX ADMIN — ACETAMINOPHEN 650 MG: 325 TABLET, FILM COATED ORAL at 06:36

## 2019-06-24 RX ADMIN — ACETAMINOPHEN 650 MG: 325 TABLET, FILM COATED ORAL at 15:59

## 2019-06-24 RX ADMIN — ACETAMINOPHEN 650 MG: 325 TABLET, FILM COATED ORAL at 20:32

## 2019-06-24 RX ADMIN — SENNOSIDES AND DOCUSATE SODIUM 1 TABLET: 8.6; 5 TABLET ORAL at 09:05

## 2019-06-24 RX ADMIN — IBUPROFEN 600 MG: 600 TABLET ORAL at 05:12

## 2019-06-24 NOTE — LACTATION NOTE
Attempted to visit.  In NICU.  Will follow as needed. Anneliese Medina BSN, RN, PHN, RNC-MNN, IBCLC

## 2019-06-24 NOTE — PLAN OF CARE
Vital signs stable. Fundus firm, U/1, bleeding scant, no clots. Up in room independently; encouraged frequent voiding. Taking Tylenol, ibuprofen for pain. Walking down to NICU every 3 hours to breastfeed . Will continue to monitor and notify MD as needed.

## 2019-06-24 NOTE — PROGRESS NOTES
PP Day 1    Pt. without c/o.  Still being treated for chorio.  Baby in NICU.  Plans discontinue tomorrow after 48 hrs.    BP 96/51   Pulse 66   Temp 97.4  F (36.3  C) (Oral)   Resp 16   SpO2 96%   Breastfeeding? Unknown  Temp (24hrs), Av.4  F (36.9  C), Min:97.4  F (36.3  C), Max:99.5  F (37.5  C)     Fundus firm and nontender.    Doing well. Anticipate discontinue tomorrow.    Electronically signed by:  Beck Gregory MD   2019 7:37 AM  [facility]      .

## 2019-06-24 NOTE — PLAN OF CARE
VSS Pt using Ibuprofen and tylenol for pain control. Up independently in the room. Going to NICU to breastfeed baby every 3 hours. Will continue to monitor.

## 2019-06-24 NOTE — ANESTHESIA POSTPROCEDURE EVALUATION
Patient: Chelle Pastrana    * No procedures listed *    Diagnosis:* No pre-op diagnosis entered *  Diagnosis Additional Information: No value filed.    Anesthesia Type:  No value filed.    Note:  Anesthesia Post Evaluation       Anesthetic complications: None          Last vitals:  Vitals:    06/24/19 0512 06/24/19 0900 06/24/19 1200   BP:  101/64    Pulse:      Resp: 16 16 16   Temp:  36.3  C (97.3  F)    SpO2:            Electronically Signed By: Bety Blackmon  June 24, 2019  4:03 PM

## 2019-06-24 NOTE — LACTATION NOTE
Initial visit with SARAH Corbett and baby.    Breastfeeding general information reviewed.   Advised to breastfeed  then pump after each feed and will continue to give HDM PRN. Baby may transfer upstairs to 410 this afternoon.  Encouraged rooming in, skin to skin, feeding on demand 8-12x/day or sooner if baby cues.  Explained benefits of holding and skin to skin.  Encouraged lots of skin to skin. Instructed on hand expression.   Continues to nurse well per mom.  Will follow up with [facility] and has a breast pump for home.   No further questions at this time.   Will follow as needed.   Anneliese Medina BSN, RN, PHN, RNC-MNN, IBCLC

## 2019-06-24 NOTE — PLAN OF CARE
Vital signs are stable.  Pt using Ibuprofen and tylenol for pain control. Up to the bathroom and voiding in good amounts. Encouraged patient to call with questions and concerns.  Will continue to monitor

## 2019-06-25 VITALS
SYSTOLIC BLOOD PRESSURE: 103 MMHG | OXYGEN SATURATION: 96 % | TEMPERATURE: 97.8 F | DIASTOLIC BLOOD PRESSURE: 67 MMHG | HEART RATE: 66 BPM | RESPIRATION RATE: 16 BRPM

## 2019-06-25 PROCEDURE — 25000132 ZZH RX MED GY IP 250 OP 250 PS 637: Performed by: SPECIALIST

## 2019-06-25 RX ORDER — IBUPROFEN 600 MG/1
600 TABLET, FILM COATED ORAL EVERY 6 HOURS PRN
COMMUNITY
Start: 2019-06-25

## 2019-06-25 RX ORDER — FUROSEMIDE 20 MG
20 TABLET ORAL ONCE
Status: COMPLETED | OUTPATIENT
Start: 2019-06-25 | End: 2019-06-25

## 2019-06-25 RX ORDER — ACETAMINOPHEN 325 MG/1
650 TABLET ORAL EVERY 6 HOURS PRN
COMMUNITY
Start: 2019-06-25

## 2019-06-25 RX ADMIN — ACETAMINOPHEN 650 MG: 325 TABLET, FILM COATED ORAL at 11:21

## 2019-06-25 RX ADMIN — IBUPROFEN 600 MG: 600 TABLET ORAL at 06:40

## 2019-06-25 RX ADMIN — FUROSEMIDE 20 MG: 20 TABLET ORAL at 09:41

## 2019-06-25 RX ADMIN — SENNOSIDES AND DOCUSATE SODIUM 1 TABLET: 8.6; 5 TABLET ORAL at 07:46

## 2019-06-25 RX ADMIN — ACETAMINOPHEN 650 MG: 325 TABLET, FILM COATED ORAL at 00:47

## 2019-06-25 RX ADMIN — ACETAMINOPHEN 650 MG: 325 TABLET, FILM COATED ORAL at 06:40

## 2019-06-25 NOTE — PLAN OF CARE
VSS, breastfeeding well with donor milk at breast, back pain well controlled with heat, Tylenol and Ibuprofen, states satisfaction with pain management, up independently with no complaints of dizziness, interacting and bonding well with infant, encouraged to call with questions or concerns, will continue to monitor.

## 2019-06-25 NOTE — PLAN OF CARE
D: VSS, assessments WDL, except for lower extremity edema.   I: Pt given lasix per OB order. Pt. received complete discharge paperwork and home medications.  Pt. was given times of last dose for all discharge medications in writing on discharge medication sheets.  Discharge teaching included home medication, pain management, activity restrictions, postpartum cares, and signs and symptoms of infection.    A: Discharge outcomes on care plan met.  Mother states understanding and comfort with self care and follow up care.   P: Pt. Discharged.  Pt. was accompanied by  and other children and left with personal belongings.  Home care referral sent.  Pt encouraged to pump after each feeding as infant is supplementing at discharge. Pt. to follow up with OB provider per discharge instructions.  Pt. had no further questions at the time of discharge and no unmet needs were identified.

## 2019-06-25 NOTE — PROGRESS NOTES
POSTPARTUM DAY 2 NOTE s/p     SUBJECTIVE:  Doing well.  No f/c/s.  Sore in back due to having 3 epidurals but feels approp.  Denies HA.  Ambulating and voiding.  Lochia wnl.  Very swollen in feet.    OBJECTIVE:  Vitals:    19 1200 19 1559 19 0047 19 0750   BP:  109/69 103/70 103/67   BP Location:    Left arm   Pulse:       Resp: 16 16 16 16   Temp:  97.3  F (36.3  C) 98  F (36.7  C) 97.8  F (36.6  C)   TempSrc:  Oral Oral Oral   SpO2:         PHYSICAL EXAM:  A&OX3 NAD  S, non tender, nd, ff  Ext: 2+ edema bilat at feet/1+ to shin bilat, no calf tenderness    No lab results found.    Invalid input(s): SODIUM, [id number], CALCIUM    Recent Labs   Lab Test 19   HGB 12.5       Patient Active Problem List    Diagnosis Date Noted     Normal delivery at term 2019     Priority: Medium     Indication for care in labor or delivery 2019     Priority: Medium     Labor abnormal 2019     Priority: Medium       ASSESSMENT & PLAN:   PPD# 2.  Doing well overall.  Plan 1 dose of Lasix before discharge given edema, though discussed usually resolves over next 1-2 wks.  Discd impt of calling for severe HAs, though BPs thus far nml.  Discharge around noon.  Instructions revd.    Electronically signed by MD Nader Mcgregor OB/GYN  Pager: [phone]  [facility]   2019 8:56 AM

## 2019-06-25 NOTE — PLAN OF CARE
Vital signs stable. Patient voiding without difficulty. Able to ambulate in room free of dizziness. Taking tylenol/ibuprofen for pain management. Using Aqua-K pad for back aches and cramping. Working on breastfeeding infant using an SNS system every 2-3 hours, pumping, and supplementing with DM via SNS. Encouraged to call with questions/concerns. Will continue to monitor.

## 2019-06-25 NOTE — LACTATION NOTE
Follow up visit.  Infant has been breast feeding well with supplement via syringe and feeding tube at breast.  Discussed continuing supplement until milk comes in and Chelle is able to pump 20 ml.  Reinforced importance of supplement as baby had initially had lower blood sugars and that she needs to continue it until her breasts are producing more milk.  Reviewed normal progression of weaning off supplement.  Chelle was encouraged to pump after each feeding, she had been pumping occasionally.  She had no supply issues with her other two children and breast fed them for 6 months.  Explained outpatient lactation resources for follow up when discharged if needing assistance. She has a pump for home use and had no other questions.    Melissa Regalado  RN, IBCLC

## 2019-07-10 ENCOUNTER — DOCUMENTATION ONLY (OUTPATIENT)
Dept: CARE COORDINATION | Facility: CLINIC | Age: 39
End: 2019-07-10

## 2019-07-10 NOTE — PROGRESS NOTES
[facility] will be sharing updates with you on Maternal Child Health Referral requests for home care services.  This is for care coordination purposes and alert you to referral status.  We received the referral for  Chelle Pastrana; MRN [id number] and want to update you:    [facility] has made two attempts to contact patient by phone and text message over the last four days.  We have not had any response from patient.  Final message was left advising patient to follow up with Primary Care Providers for mom and baby.  Ordering MD and Primary Care Providers for mom and baby notified.    Sincerely [facility]  Antonio Jhaveri  [phone]

## 2020-03-11 ENCOUNTER — HEALTH MAINTENANCE LETTER (OUTPATIENT)
Age: 40
End: 2020-03-11

## 2021-01-03 ENCOUNTER — HEALTH MAINTENANCE LETTER (OUTPATIENT)
Age: 41
End: 2021-01-03

## 2021-04-25 ENCOUNTER — HEALTH MAINTENANCE LETTER (OUTPATIENT)
Age: 41
End: 2021-04-25

## 2021-10-10 ENCOUNTER — HEALTH MAINTENANCE LETTER (OUTPATIENT)
Age: 41
End: 2021-10-10

## 2022-05-21 ENCOUNTER — HEALTH MAINTENANCE LETTER (OUTPATIENT)
Age: 42
End: 2022-05-21

## 2022-09-18 ENCOUNTER — HEALTH MAINTENANCE LETTER (OUTPATIENT)
Age: 42
End: 2022-09-18

## 2023-06-04 ENCOUNTER — HEALTH MAINTENANCE LETTER (OUTPATIENT)
Age: 43
End: 2023-06-04

## 2024-02-25 ENCOUNTER — HEALTH MAINTENANCE LETTER (OUTPATIENT)
Age: 44
End: 2024-02-25

## 2024-10-10 ENCOUNTER — HOSPITAL ENCOUNTER (EMERGENCY)
Facility: CLINIC | Age: 44
Discharge: HOME | End: 2024-10-10
Attending: EMERGENCY MEDICINE | Admitting: EMERGENCY MEDICINE
Payer: COMMERCIAL

## 2024-10-10 ENCOUNTER — APPOINTMENT (OUTPATIENT)
Dept: CT IMAGING | Facility: CLINIC | Age: 44
End: 2024-10-10
Attending: EMERGENCY MEDICINE
Payer: COMMERCIAL

## 2024-10-10 VITALS
WEIGHT: 144 LBS | DIASTOLIC BLOOD PRESSURE: 76 MMHG | BODY MASS INDEX: 27.21 KG/M2 | SYSTOLIC BLOOD PRESSURE: 125 MMHG | TEMPERATURE: 98.6 F | OXYGEN SATURATION: 99 % | HEART RATE: 65 BPM | RESPIRATION RATE: 20 BRPM

## 2024-10-10 DIAGNOSIS — N13.30 HYDRONEPHROSIS, LEFT: ICD-10-CM

## 2024-10-10 LAB
ALBUMIN SERPL BCG-MCNC: 4.6 G/DL (ref 3.5–5.2)
ALBUMIN UR-MCNC: NEGATIVE MG/DL
ALP SERPL-CCNC: 70 U/L (ref 40–150)
ALT SERPL W P-5'-P-CCNC: 26 U/L (ref 0–50)
ANION GAP SERPL CALCULATED.3IONS-SCNC: 12 MMOL/L (ref 7–15)
APPEARANCE UR: CLEAR
AST SERPL W P-5'-P-CCNC: 19 U/L (ref 0–45)
BASOPHILS # BLD AUTO: 0.1 10E3/UL (ref 0–0.2)
BASOPHILS NFR BLD AUTO: 1 %
BILIRUB DIRECT SERPL-MCNC: <0.2 MG/DL (ref 0–0.3)
BILIRUB SERPL-MCNC: 0.9 MG/DL
BILIRUB UR QL STRIP: NEGATIVE
BUN SERPL-MCNC: 8.7 MG/DL (ref 6–20)
CALCIUM SERPL-MCNC: 9.3 MG/DL (ref 8.8–10.4)
CHLORIDE SERPL-SCNC: 105 MMOL/L (ref 98–107)
COLOR UR AUTO: ABNORMAL
CREAT SERPL-MCNC: 0.69 MG/DL (ref 0.51–0.95)
EGFRCR SERPLBLD CKD-EPI 2021: >90 ML/MIN/1.73M2
EOSINOPHIL # BLD AUTO: 0.2 10E3/UL (ref 0–0.7)
EOSINOPHIL NFR BLD AUTO: 2 %
ERYTHROCYTE [DISTWIDTH] IN BLOOD BY AUTOMATED COUNT: 12.9 % (ref 10–15)
GLUCOSE SERPL-MCNC: 101 MG/DL (ref 70–99)
GLUCOSE UR STRIP-MCNC: NEGATIVE MG/DL
HCG UR QL: NEGATIVE
HCO3 SERPL-SCNC: 23 MMOL/L (ref 22–29)
HCT VFR BLD AUTO: 43.3 % (ref 35–47)
HGB BLD-MCNC: 14.5 G/DL (ref 11.7–15.7)
HGB UR QL STRIP: NEGATIVE
IMM GRANULOCYTES # BLD: 0 10E3/UL
IMM GRANULOCYTES NFR BLD: 0 %
KETONES UR STRIP-MCNC: NEGATIVE MG/DL
LEUKOCYTE ESTERASE UR QL STRIP: NEGATIVE
LIPASE SERPL-CCNC: 46 U/L (ref 13–60)
LYMPHOCYTES # BLD AUTO: 2.9 10E3/UL (ref 0.8–5.3)
LYMPHOCYTES NFR BLD AUTO: 26 %
MCH RBC QN AUTO: 29.5 PG (ref 26.5–33)
MCHC RBC AUTO-ENTMCNC: 33.5 G/DL (ref 31.5–36.5)
MCV RBC AUTO: 88 FL (ref 78–100)
MONOCYTES # BLD AUTO: 0.8 10E3/UL (ref 0–1.3)
MONOCYTES NFR BLD AUTO: 7 %
NEUTROPHILS # BLD AUTO: 7.1 10E3/UL (ref 1.6–8.3)
NEUTROPHILS NFR BLD AUTO: 64 %
NITRATE UR QL: NEGATIVE
NRBC # BLD AUTO: 0 10E3/UL
NRBC BLD AUTO-RTO: 0 /100
PH UR STRIP: 6 [PH] (ref 5–7)
PLATELET # BLD AUTO: 359 10E3/UL (ref 150–450)
POTASSIUM SERPL-SCNC: 3.7 MMOL/L (ref 3.4–5.3)
PROT SERPL-MCNC: 7.2 G/DL (ref 6.4–8.3)
RBC # BLD AUTO: 4.92 10E6/UL (ref 3.8–5.2)
RBC URINE: 0 /HPF
SODIUM SERPL-SCNC: 140 MMOL/L (ref 135–145)
SP GR UR STRIP: 1 (ref 1–1.03)
SQUAMOUS EPITHELIAL: <1 /HPF
UROBILINOGEN UR STRIP-MCNC: NORMAL MG/DL
WBC # BLD AUTO: 11.1 10E3/UL (ref 4–11)
WBC URINE: 0 /HPF

## 2024-10-10 PROCEDURE — 81003 URINALYSIS AUTO W/O SCOPE: CPT | Performed by: EMERGENCY MEDICINE

## 2024-10-10 PROCEDURE — 74177 CT ABD & PELVIS W/CONTRAST: CPT

## 2024-10-10 PROCEDURE — 82248 BILIRUBIN DIRECT: CPT | Performed by: EMERGENCY MEDICINE

## 2024-10-10 PROCEDURE — 81025 URINE PREGNANCY TEST: CPT | Performed by: EMERGENCY MEDICINE

## 2024-10-10 PROCEDURE — 250N000009 HC RX 250: Performed by: EMERGENCY MEDICINE

## 2024-10-10 PROCEDURE — 85014 HEMATOCRIT: CPT | Performed by: EMERGENCY MEDICINE

## 2024-10-10 PROCEDURE — 36415 COLL VENOUS BLD VENIPUNCTURE: CPT | Performed by: EMERGENCY MEDICINE

## 2024-10-10 PROCEDURE — 87086 URINE CULTURE/COLONY COUNT: CPT | Performed by: EMERGENCY MEDICINE

## 2024-10-10 PROCEDURE — 83690 ASSAY OF LIPASE: CPT | Performed by: EMERGENCY MEDICINE

## 2024-10-10 PROCEDURE — 80048 BASIC METABOLIC PNL TOTAL CA: CPT | Performed by: EMERGENCY MEDICINE

## 2024-10-10 PROCEDURE — 85004 AUTOMATED DIFF WBC COUNT: CPT | Performed by: EMERGENCY MEDICINE

## 2024-10-10 PROCEDURE — 99285 EMERGENCY DEPT VISIT HI MDM: CPT | Mod: 25

## 2024-10-10 PROCEDURE — 250N000011 HC RX IP 250 OP 636: Performed by: EMERGENCY MEDICINE

## 2024-10-10 RX ORDER — IOPAMIDOL 755 MG/ML
72 INJECTION, SOLUTION INTRAVASCULAR ONCE
Status: COMPLETED | OUTPATIENT
Start: 2024-10-10 | End: 2024-10-10

## 2024-10-10 RX ORDER — TAMSULOSIN HYDROCHLORIDE 0.4 MG/1
0.4 CAPSULE ORAL DAILY
Qty: 7 CAPSULE | Refills: 0 | Status: SHIPPED | OUTPATIENT
Start: 2024-10-10 | End: 2024-10-17

## 2024-10-10 RX ADMIN — IOPAMIDOL 72 ML: 755 INJECTION, SOLUTION INTRAVENOUS at 20:49

## 2024-10-10 RX ADMIN — SODIUM CHLORIDE 61 ML: 9 INJECTION, SOLUTION INTRAVENOUS at 20:49

## 2024-10-10 ASSESSMENT — ACTIVITIES OF DAILY LIVING (ADL)
ADLS_ACUITY_SCORE: 35

## 2024-10-10 ASSESSMENT — COLUMBIA-SUICIDE SEVERITY RATING SCALE - C-SSRS
1. IN THE PAST MONTH, HAVE YOU WISHED YOU WERE DEAD OR WISHED YOU COULD GO TO SLEEP AND NOT WAKE UP?: NO
2. HAVE YOU ACTUALLY HAD ANY THOUGHTS OF KILLING YOURSELF IN THE PAST MONTH?: NO
6. HAVE YOU EVER DONE ANYTHING, STARTED TO DO ANYTHING, OR PREPARED TO DO ANYTHING TO END YOUR LIFE?: NO

## 2024-10-10 NOTE — ED TRIAGE NOTES
Pt presents to ed to be evaluated for recurrent UTI, and back pain.   Pt states she has been on 3 different antibiotics for UTI. Today developing fever and R flank/back pain again.      Triage Assessment (Adult)       Row Name 10/10/24 7994          Triage Assessment    Airway WDL WDL        Respiratory WDL    Respiratory WDL WDL

## 2024-10-10 NOTE — ED PROVIDER NOTES
Emergency Department Note      History of Present Illness     Chief Complaint   UTI and Back Pain    HPI   Chelle Pastrana is a 44 year old female who presents to the ED for concern of a UTI and back pain. On 9/11/24, the patient was diagnosed with COVID. About a week after that, she was diagnosed with a UTI and prescribed antibiotics. Her culture grew E. coli. On 9/23/24, she was not feeling much better, so her provider switched her to Keflex. She felt a bit better after this; however, on 10/4/24, she began to experience some dysuria and was started on Augmentin. Over the past week, she has not been feeling well, endorses chills and general malaise. She did have a follow-up appointment today and they recommended that she sees Urology-Gynecology, but she could not make an appointment until 11/12/24. Also, today, she has noted a low-grade fever, as well as worsening left sided flank pain. She denies nausea or vomiting. No blood in her urine. No history of kidney stones.     Independent Historian   None    Review of External Notes   None    Past Medical History   Medical History and Problem List   No other significant past medical history or family history.    Medications   The patient is currently on no regular medications.    Surgical History   Cholecystectomy  Tonsillectomy  Colonoscopy  Herniorrhaphy    Physical Exam   Patient Vitals for the past 24 hrs:   BP Temp Temp src Pulse Resp SpO2 Weight   10/10/24 2245 -- -- -- -- -- 97 % --   10/10/24 2230 -- -- -- -- -- 98 % --   10/10/24 1720 137/81 98.6  F (37  C) Temporal 94 20 99 % 65.3 kg (144 lb)     Physical Exam   Eyes:  The pupils are equal and round    Conjunctivae and sclerae are normal  ENT:    The nose is normal    Pinnae are normal  CV:  Regular rate and rhythm     No edema  Resp:  Lungs are clear    Non-labored    No rales    No wheezing   GI:  Abdomen is soft and non-tender, there is no rigidity  MS:  Normal muscular tone    No asymmetric leg  swelling    Mild tenderness of the left low back/flank area  Skin:  No rash or acute skin lesions noted  Neuro:   Awake, alert.      Speech is normal and fluent.    Face is symmetric.     Moves all extremities      Diagnostics   Lab Results   Labs Ordered and Resulted from Time of ED Arrival to Time of ED Departure   URINE MACROSCOPIC WITH REFLEX TO MICRO - Abnormal       Result Value    Color Urine Straw      Appearance Urine Clear      Glucose Urine Negative      Bilirubin Urine Negative      Ketones Urine Negative      Specific Gravity Urine 1.002 (*)     Blood Urine Negative      pH Urine 6.0      Protein Albumin Urine Negative      Urobilinogen Urine Normal      Nitrite Urine Negative      Leukocyte Esterase Urine Negative      RBC Urine 0      WBC Urine 0      Squamous Epithelials Urine <1     BASIC METABOLIC PANEL - Abnormal    Sodium 140      Potassium 3.7      Chloride 105      Carbon Dioxide (CO2) 23      Anion Gap 12      Urea Nitrogen 8.7      Creatinine 0.69      GFR Estimate >90      Calcium 9.3      Glucose 101 (*)    CBC WITH PLATELETS AND DIFFERENTIAL - Abnormal    WBC Count 11.1 (*)     RBC Count 4.92      Hemoglobin 14.5      Hematocrit 43.3      MCV 88      MCH 29.5      MCHC 33.5      RDW 12.9      Platelet Count 359      % Neutrophils 64      % Lymphocytes 26      % Monocytes 7      % Eosinophils 2      % Basophils 1      % Immature Granulocytes 0      NRBCs per 100 WBC 0      Absolute Neutrophils 7.1      Absolute Lymphocytes 2.9      Absolute Monocytes 0.8      Absolute Eosinophils 0.2      Absolute Basophils 0.1      Absolute Immature Granulocytes 0.0      Absolute NRBCs 0.0     HCG QUALITATIVE URINE - Normal    hCG Urine Qualitative Negative     LIPASE - Normal    Lipase 46     HEPATIC FUNCTION PANEL - Normal    Protein Total 7.2      Albumin 4.6      Bilirubin Total 0.9      Alkaline Phosphatase 70      AST 19      ALT 26      Bilirubin Direct <0.20     URINE CULTURE     Imaging   CT  Abdomen Pelvis w Contrast   Final Result   IMPRESSION:    1.  Mild left hydronephrosis which may be secondary to urinary tract infection. No evidence for pyelonephritis. No urinary tract calcification.        Independent Interpretation   None    ED Course    Medications Administered   Medications   Saline Flush - CT (61 mLs Intravenous $Given 10/10/24 2049)   iopamidol (ISOVUE-370) solution 72 mL (72 mLs Intravenous $Given 10/10/24 2049)     Procedures   Procedures     Discussion of Management   None    ED Course   ED Course as of 10/10/24 2308   u Oct 10, 2024   1746 I obtained history and examined the patient as noted above.     2305 I rechecked and updated the patient. The patient is comfortable with plan for discharge.       Additional Documentation  None    Medical Decision Making / Diagnosis   CMS Diagnoses: None    MIPS       None    [facility]   Chelle Pastrana is a 44 year old female who presents with dysuria and left-sided back and flank pain.  Symptoms have been ongoing recently.  She has had several rounds of UTIs.  She was referred here to the ER for further evaluation after being on Augmentin.  Urinalysis here actually looks quite clear with 0 white blood cells, negative leukocyte esterase, negative nitrites.  White blood cell count is 11.1.  Hemoglobin is normal.  Kidney function is normal.    CT scan was performed given the pain and recurrent urinary tract infections.  CT scan with contrast did not reveal any signs of pyelonephritis or infections of the bladder.  This corresponds with her urinalysis.  The CT scan did show some mild left-sided hydronephrosis.    Unclear if this is related to her recent urinary tract infections.  Patient later related that there was a small abnormal thing in the toilet after she urinated a couple of days ago.  She thought maybe it was a kidney stone but she is unsure.  In any case she is continue to have some discomfort on her left side.  Will trial some Flomax to  see if that will improve her symptoms and improve the hydronephrosis.  Discussed side effects of the medication.  Recommended follow-up with urology.  She is given her information on urologic follow-up.  She has some schedule with urogynecology in November but this may be too long of a wait so we will give her other options as well.  She was discharged to home.  She is encouraged return with any new or worrisome symptoms.    I remove the patient's IV.    Disposition   The patient was discharged.     Diagnosis     ICD-10-CM    1. Hydronephrosis, left  N13.30            Discharge Medications   New Prescriptions    TAMSULOSIN (FLOMAX) 0.4 MG CAPSULE    Take 1 capsule (0.4 mg) by mouth daily for 7 days.     Scribe Disclosure:  I, Elliott Donnelly, am serving as a scribe at 5:55 PM on 10/10/2024 to document services personally performed by Temo Cordero MD based on my observations and the provider's statements to me.        Temo Cordero MD  10/10/24 3212

## 2024-10-12 LAB — BACTERIA UR CULT: NO GROWTH

## 2024-12-01 ENCOUNTER — HEALTH MAINTENANCE LETTER (OUTPATIENT)
Age: 44
End: 2024-12-01